# Patient Record
Sex: FEMALE | Race: WHITE | NOT HISPANIC OR LATINO | Employment: FULL TIME | RURAL
[De-identification: names, ages, dates, MRNs, and addresses within clinical notes are randomized per-mention and may not be internally consistent; named-entity substitution may affect disease eponyms.]

---

## 2019-11-20 ENCOUNTER — OFFICE VISIT (OUTPATIENT)
Dept: FAMILY MEDICINE CLINIC | Facility: CLINIC | Age: 19
End: 2019-11-20

## 2019-11-20 VITALS
HEIGHT: 62 IN | DIASTOLIC BLOOD PRESSURE: 62 MMHG | HEART RATE: 114 BPM | BODY MASS INDEX: 24.84 KG/M2 | SYSTOLIC BLOOD PRESSURE: 102 MMHG | RESPIRATION RATE: 19 BRPM | TEMPERATURE: 99.1 F | WEIGHT: 135 LBS | OXYGEN SATURATION: 98 %

## 2019-11-20 DIAGNOSIS — Z02.1 ENCOUNTER FOR PRE-EMPLOYMENT EXAMINATION: Primary | ICD-10-CM

## 2019-11-20 PROCEDURE — PEPHY: Performed by: FAMILY MEDICINE

## 2019-11-20 RX ORDER — FLUCONAZOLE 150 MG/1
TABLET ORAL
Refills: 0 | COMMUNITY
Start: 2019-09-16

## 2019-11-20 RX ORDER — GLYCERIN/MIN OIL/POLYCARBOPHIL
1000 GEL WITH APPLICATOR (GRAM) VAGINAL EVERY 6 HOURS PRN
Refills: 1 | COMMUNITY
Start: 2019-09-27

## 2019-11-20 RX ORDER — LANOLIN ALCOHOL/MO/W.PET/CERES
CREAM (GRAM) TOPICAL
Refills: 0 | COMMUNITY
Start: 2019-09-27

## 2019-11-20 RX ORDER — IBUPROFEN 600 MG/1
600 TABLET ORAL EVERY 6 HOURS PRN
Refills: 1 | COMMUNITY
Start: 2019-09-27

## 2019-11-20 RX ORDER — VALACYCLOVIR HYDROCHLORIDE 500 MG/1
500 TABLET, FILM COATED ORAL DAILY
Refills: 1 | COMMUNITY
Start: 2019-09-22

## 2019-11-20 RX ORDER — DOCUSATE SODIUM 100 MG/1
CAPSULE, LIQUID FILLED ORAL
Refills: 1 | COMMUNITY
Start: 2019-09-27

## 2019-11-20 NOTE — PROGRESS NOTES
Subjective   Chantell Naylor is a 19 y.o. female.     Chief Complaint   Patient presents with   • Employment Physical       Pre-Employment Exam:  Overall has: moderate activity with work/home activities, good appetite, feels well with no complaints, good energy level and is sleeping well. Nutrition: balanced diet. The patient has no complaints at this time. Physical limitations: none.             I personally reviewed and updated the patient's allergies, medications, problem list, and past medical, surgical, social, and family history.     History reviewed. No pertinent family history.    Social History     Tobacco Use   • Smoking status: Never Smoker   • Smokeless tobacco: Never Used   Substance Use Topics   • Alcohol use: Yes     Frequency: Monthly or less   • Drug use: Not on file       History reviewed. No pertinent surgical history.    Patient Active Problem List   Diagnosis   • Encounter for pre-employment examination         Current Outpatient Medications:   •  valACYclovir (VALTREX) 500 MG tablet, Take 500 mg by mouth Daily., Disp: , Rfl: 1  •  [employer] ACETAMINOPHEN EX  MG tablet, Take 1,000 mg by mouth Every 6 (Six) Hours As Needed., Disp: , Rfl: 1  •  docusate sodium (COLACE) 100 MG capsule, TAKE 1 CAPSULE BY MOUTH TWICE A DAY AS NEEDED FOR CONSTIPATION, Disp: , Rfl: 1  •  ferrous sulfate 325 (65 FE) MG EC tablet, TAKE 1 TABLET BY MOUTH EVERY DAY FOR ANEMIA, Disp: , Rfl: 0  •  fluconazole (DIFLUCAN) 150 MG tablet, TAKE 1 TABLET (150 MG) BY MOUTH ONCE, MAY REPEAT IN 3 DAYS IF NEEDED, Disp: , Rfl: 0  •  ibuprofen (ADVIL,MOTRIN) 600 MG tablet, Take 600 mg by mouth Every 6 (Six) Hours As Needed., Disp: , Rfl: 1         Review of Systems   Constitutional: Negative for chills and diaphoresis.   Eyes: Negative for visual disturbance.   Respiratory: Negative for shortness of breath.    Cardiovascular: Negative for chest pain and palpitations.   Gastrointestinal: Negative for abdominal pain and nausea.  "  Endocrine: Negative for polydipsia and polyphagia.   Musculoskeletal: Negative for neck stiffness.   Skin: Negative for color change and pallor.   Neurological: Negative for seizures and syncope.   Hematological: Negative for adenopathy.       Objective   /62   Pulse 114   Temp 99.1 °F (37.3 °C)   Resp 19   Ht 157.5 cm (62\")   Wt 61.2 kg (135 lb)   SpO2 98%   BMI 24.69 kg/m²   Wt Readings from Last 3 Encounters:   11/20/19 61.2 kg (135 lb)     Physical Exam   Constitutional: She is oriented to person, place, and time. She appears well-developed and well-nourished.   Cardiovascular: Normal rate, regular rhythm, S1 normal, S2 normal, normal heart sounds, intact distal pulses and normal pulses. Exam reveals no gallop and no friction rub.   No murmur heard.  Pulmonary/Chest: Effort normal and breath sounds normal. No accessory muscle usage or stridor. She has no decreased breath sounds. She has no wheezes. She has no rhonchi. She has no rales.   Abdominal: Soft. Normal appearance, normal aorta and bowel sounds are normal. She exhibits no distension, no pulsatile midline mass and no mass. There is no hepatosplenomegaly. There is no tenderness. There is no rigidity, no rebound, no guarding, no CVA tenderness and negative Patel's sign. No hernia.   Neurological: She is alert and oriented to person, place, and time. She has normal strength and normal reflexes. She displays no tremor. No cranial nerve deficit or sensory deficit. She exhibits normal muscle tone. She displays no seizure activity. Coordination and gait normal.   Skin: Skin is warm and dry. Turgor is normal. She is not diaphoretic. No pallor.         Assessment/Plan       Preemployment physical.  Doing well, no problems identified today.  Cleared for work.  Problem List Items Addressed This Visit        Other    Encounter for pre-employment examination - Primary              Expected course, medications, and adverse effects discussed.  Call or " return if worsening or persistent symptoms.

## 2021-01-29 ENCOUNTER — HOSPITAL ENCOUNTER (OUTPATIENT)
Dept: URGENT CARE | Facility: CLINIC | Age: 21
Discharge: HOME | End: 2021-01-29
Attending: PHYSICIAN ASSISTANT